# Patient Record
(demographics unavailable — no encounter records)

---

## 2024-11-14 NOTE — PHYSICAL EXAM
[Alert] : alert [Oriented to Person] : oriented to person [Oriented to Place] : oriented to place [Oriented to Time] : oriented to time [Calm] : calm [de-identified] : well appearing male in NAD

## 2024-11-14 NOTE — REVIEW OF SYSTEMS
[Negative] : Psychiatric [FreeTextEntry9] : has some orthopedic issues with lower extremeties- surgery as child

## 2024-11-14 NOTE — ASSESSMENT
[FreeTextEntry1] : Patient with class 1 obesity- would benefit from [facility] program GLP-1 may help him remain off alcohol- recently stopped

## 2024-11-14 NOTE — HISTORY OF PRESENT ILLNESS
[de-identified] : Initial Medical Weight Loss Intake: - Reason for Visit : 57 year old presents today for his/her/their initial medical weight loss visit - History of Present Illness (HPI) : - Current Weight/BMI : Current weight is 225 pounds. - Highest Weight : 225 lbs - Lowest Weight : 185 lbs Previous Weight Loss Efforts : - Medications/Supplements : No previous use weight loss medication - Procedures/Operations : No previous weight loss surgery  - Medications Contributing to Weight Gain : No medications that contribute to weight gain Vitamin Deficiencies : Vitamin B12 deficiency, taking B12 supplements and thiamine supplements  - Meal Frequency : Erratic meal pattern described.  - Alcohol Consumption : stopped alcohol 2 months ago  - Movement Assessments : Limited physical activity due to leg disability and weight affecting knees. Previously more active when weighing around 180-185 pounds.

## 2024-11-14 NOTE — REASON FOR VISIT
[Consultation] : a consultation visit [FreeTextEntry1] : Patient is here to discuss weight loss options

## 2024-11-14 NOTE — PLAN
[FreeTextEntry1] :  1. Start Wegovy 0.25 mg weekly for 4 weeks then increase dose to 0.5 mg weekly 2. Nutrition consult with diet history 3.  Followup Dr. Loevll in 8 weeks 4. increase protein intake 5. needs to create regular meals  6. start strength training

## 2025-01-08 NOTE — PLAN
[FreeTextEntry1] : 1. Increase Wegovy dose to 0.5 mg weekly 2. Nutrition consult with diet history- reschedule 3.  Followup Dr. Lovell in 8 weeks

## 2025-01-08 NOTE — HISTORY OF PRESENT ILLNESS
[de-identified] : Return [facility] visit     Initial weight/BMI:  225 lbs     Current weight:  220 lbs     Weight change:  5 lbs   HPI:   Since our last visit:      Response to AOM (if started): some appetite suppression    Tolerability    Response to meds    Weight loss: 5 lbs    Side effects: none    Timing of medication   Changes in lifestyle habits:   has not met with nutritionist yet

## 2025-01-08 NOTE — PHYSICAL EXAM
[Alert] : alert [Oriented to Person] : oriented to person [Oriented to Place] : oriented to place [Oriented to Time] : oriented to time [Calm] : calm [de-identified] : well appearing male in NAD